# Patient Record
Sex: FEMALE | Race: WHITE | NOT HISPANIC OR LATINO | URBAN - METROPOLITAN AREA
[De-identification: names, ages, dates, MRNs, and addresses within clinical notes are randomized per-mention and may not be internally consistent; named-entity substitution may affect disease eponyms.]

---

## 2020-01-07 ENCOUNTER — FOLLOW UP ESTABLISHED (OUTPATIENT)
Dept: URBAN - METROPOLITAN AREA CLINIC 24 | Facility: CLINIC | Age: 75
End: 2020-01-07
Payer: MEDICARE

## 2020-01-07 DIAGNOSIS — H26.493 OTHER SECONDARY CATARACT, BILATERAL: ICD-10-CM

## 2020-01-07 DIAGNOSIS — H04.123 TEAR FILM INSUFFICIENCY OF BILATERAL LACRIMAL GLANDS: Primary | ICD-10-CM

## 2020-01-07 PROCEDURE — 92004 COMPRE OPH EXAM NEW PT 1/>: CPT | Performed by: OPTOMETRIST

## 2020-01-07 ASSESSMENT — VISUAL ACUITY
OS: 20/20
OD: 20/20

## 2020-01-07 ASSESSMENT — INTRAOCULAR PRESSURE
OD: 15
OS: 15

## 2020-01-07 ASSESSMENT — KERATOMETRY
OD: 43.05
OS: 43.47

## 2021-11-23 ENCOUNTER — OFFICE VISIT (OUTPATIENT)
Dept: URBAN - METROPOLITAN AREA CLINIC 30 | Facility: CLINIC | Age: 76
End: 2021-11-23
Payer: MEDICARE

## 2021-11-23 DIAGNOSIS — H43.811 VITREOUS DEGENERATION, RIGHT EYE: ICD-10-CM

## 2021-11-23 PROCEDURE — 92134 CPTRZ OPH DX IMG PST SGM RTA: CPT | Performed by: OPTOMETRIST

## 2021-11-23 PROCEDURE — 99214 OFFICE O/P EST MOD 30 MIN: CPT | Performed by: OPTOMETRIST

## 2021-11-23 ASSESSMENT — INTRAOCULAR PRESSURE
OD: 17
OS: 17

## 2021-11-23 ASSESSMENT — KERATOMETRY
OD: 43.38
OS: 43.52

## 2021-11-23 ASSESSMENT — VISUAL ACUITY
OD: 20/20
OS: 20/25

## 2021-11-23 NOTE — IMPRESSION/PLAN
Impression: Other secondary cataract, bilateral Plan: Deferred YAG last year. Opacified capsule with option for YAG laser capsulotomy. Discussed procedure, risks, benefits and side effects. Patient requests YAG laser capsulotomy. YAG OS only. Dr. Hank Page primary care- f/u after.

## 2021-11-23 NOTE — IMPRESSION/PLAN
Impression: Vitreous degeneration, right eye Plan: There is no evidence of retinal pathology. All signs and risks of retinal detachment or tears were discussed in detail. If pt. notices any symptoms discussed, contact office ASAP. Recommend pt. return for normal recall.   See mac OCT findings- wnl OU

## 2021-12-07 ENCOUNTER — SURGERY (OUTPATIENT)
Dept: URBAN - METROPOLITAN AREA SURGERY 12 | Facility: SURGERY | Age: 76
End: 2021-12-07
Payer: MEDICARE

## 2021-12-07 PROCEDURE — 66821 AFTER CATARACT LASER SURGERY: CPT | Performed by: OPHTHALMOLOGY

## 2023-03-08 ENCOUNTER — OFFICE VISIT (OUTPATIENT)
Dept: URBAN - METROPOLITAN AREA CLINIC 30 | Facility: CLINIC | Age: 78
End: 2023-03-08
Payer: MEDICARE

## 2023-03-08 DIAGNOSIS — H35.363 DRUSEN (DEGENERATIVE) OF MACULA, BILATERAL: ICD-10-CM

## 2023-03-08 DIAGNOSIS — H53.2 DIPLOPIA: ICD-10-CM

## 2023-03-08 DIAGNOSIS — H02.88A MGD OF RT EYE, UPPER AND LOWER EYELIDS: ICD-10-CM

## 2023-03-08 DIAGNOSIS — H43.811 VITREOUS DEGENERATION, RIGHT EYE: ICD-10-CM

## 2023-03-08 DIAGNOSIS — H04.123 TEAR FILM INSUFFICIENCY OF BILATERAL LACRIMAL GLANDS: Primary | ICD-10-CM

## 2023-03-08 DIAGNOSIS — H26.492 OTHER SECONDARY CATARACT, LEFT EYE: ICD-10-CM

## 2023-03-08 DIAGNOSIS — H02.88B MGD OF LT EYE, UPPER AND LOWER EYELIDS: ICD-10-CM

## 2023-03-08 PROCEDURE — 92012 INTRM OPH EXAM EST PATIENT: CPT | Performed by: OPTOMETRIST

## 2023-03-08 PROCEDURE — 92134 CPTRZ OPH DX IMG PST SGM RTA: CPT | Performed by: OPTOMETRIST

## 2023-03-08 ASSESSMENT — VISUAL ACUITY
OS: 20/20
OD: 20/20

## 2023-03-08 ASSESSMENT — KERATOMETRY
OD: 43.30
OS: 43.35

## 2023-03-08 ASSESSMENT — INTRAOCULAR PRESSURE
OD: 16
OS: 16

## 2023-03-08 NOTE — IMPRESSION/PLAN
Impression: MGD of rt eye, upper and lower eyelids: H02.88A. Plan: See other notes for clinical correlation.

## 2023-03-08 NOTE — IMPRESSION/PLAN
Impression: Drusen (degenerative) of macula, bilateral: H35.363. Plan: Trace, pinpoint OU.   Mild. Cont to monitor. Recommend [employer] or Preservision Vitamins. See MAC OCT.

## 2023-03-08 NOTE — IMPRESSION/PLAN
Impression: Tear film insufficiency of bilateral lacrimal glands Plan: +MGD OU. Using Systane PRN- recommend QID OU, Omega 3s, and WCs.

## 2023-03-08 NOTE — IMPRESSION/PLAN
Impression: Other secondary cataract, left eye: H26.492. Plan: Opacified capsule with option for YAG laser capsulotomy. Discussed procedure, risks, benefits and side effects.

## 2023-03-08 NOTE — IMPRESSION/PLAN
Impression: Diplopia: H53.2. Plan: Very intermittent, per pt. Notices at nighttime while reading. Monitor for now.

## 2023-03-08 NOTE — IMPRESSION/PLAN
Impression: MGD of lt eye, upper and lower eyelids: H02.88B. Plan: WC's qhs OU. Consider Erythromycin/ Maxitrol irvin qhs OU to the lids.